# Patient Record
Sex: FEMALE | Race: WHITE | URBAN - METROPOLITAN AREA
[De-identification: names, ages, dates, MRNs, and addresses within clinical notes are randomized per-mention and may not be internally consistent; named-entity substitution may affect disease eponyms.]

---

## 2019-05-10 ENCOUNTER — APPOINTMENT (OUTPATIENT)
Dept: URBAN - METROPOLITAN AREA SURGERY 9 | Age: 49
Setting detail: DERMATOLOGY
End: 2019-05-10

## 2019-05-10 DIAGNOSIS — I78.8 OTHER DISEASES OF CAPILLARIES: ICD-10-CM

## 2019-05-10 DIAGNOSIS — Z85.820 PERSONAL HISTORY OF MALIGNANT MELANOMA OF SKIN: ICD-10-CM

## 2019-05-10 DIAGNOSIS — L663 OTHER SPECIFIED DISEASES OF HAIR AND HAIR FOLLICLES: ICD-10-CM

## 2019-05-10 DIAGNOSIS — D22 MELANOCYTIC NEVI: ICD-10-CM

## 2019-05-10 DIAGNOSIS — Z41.9 ENCOUNTER FOR PROCEDURE FOR PURPOSES OTHER THAN REMEDYING HEALTH STATE, UNSPECIFIED: ICD-10-CM

## 2019-05-10 DIAGNOSIS — L738 OTHER SPECIFIED DISEASES OF HAIR AND HAIR FOLLICLES: ICD-10-CM

## 2019-05-10 DIAGNOSIS — L03.01 CELLULITIS OF FINGER: ICD-10-CM

## 2019-05-10 PROBLEM — F41.9 ANXIETY DISORDER, UNSPECIFIED: Status: ACTIVE | Noted: 2019-05-10

## 2019-05-10 PROBLEM — L02.821 FURUNCLE OF HEAD [ANY PART, EXCEPT FACE]: Status: ACTIVE | Noted: 2019-05-10

## 2019-05-10 PROBLEM — D48.5 NEOPLASM OF UNCERTAIN BEHAVIOR OF SKIN: Status: ACTIVE | Noted: 2019-05-10

## 2019-05-10 PROBLEM — J30.1 ALLERGIC RHINITIS DUE TO POLLEN: Status: ACTIVE | Noted: 2019-05-10

## 2019-05-10 PROBLEM — J45.909 UNSPECIFIED ASTHMA, UNCOMPLICATED: Status: ACTIVE | Noted: 2019-05-10

## 2019-05-10 PROBLEM — D22.39 MELANOCYTIC NEVI OF OTHER PARTS OF FACE: Status: ACTIVE | Noted: 2019-05-10

## 2019-05-10 PROBLEM — L03.011 CELLULITIS OF RIGHT FINGER: Status: ACTIVE | Noted: 2019-05-10

## 2019-05-10 PROBLEM — F32.9 MAJOR DEPRESSIVE DISORDER, SINGLE EPISODE, UNSPECIFIED: Status: ACTIVE | Noted: 2019-05-10

## 2019-05-10 PROBLEM — L03.012 CELLULITIS OF LEFT FINGER: Status: ACTIVE | Noted: 2019-05-10

## 2019-05-10 PROCEDURE — 88305 TISSUE EXAM BY PATHOLOGIST: CPT | Mod: TC

## 2019-05-10 PROCEDURE — OTHER TREATMENT REGIMEN: OTHER

## 2019-05-10 PROCEDURE — OTHER BIOPSY BY SHAVE METHOD: OTHER

## 2019-05-10 PROCEDURE — OTHER COUNSELING: OTHER

## 2019-05-10 PROCEDURE — 11102 TANGNTL BX SKIN SINGLE LES: CPT

## 2019-05-10 PROCEDURE — 99203 OFFICE O/P NEW LOW 30 MIN: CPT | Mod: 25

## 2019-05-10 PROCEDURE — OTHER MIPS QUALITY: OTHER

## 2019-05-10 PROCEDURE — OTHER REASSURANCE: OTHER

## 2019-05-10 PROCEDURE — OTHER RECOMMENDATIONS: OTHER

## 2019-05-10 PROCEDURE — OTHER OTHER: OTHER

## 2019-05-10 PROCEDURE — OTHER PATHOLOGY BILLING: OTHER

## 2019-05-10 PROCEDURE — OTHER PRESCRIPTION: OTHER

## 2019-05-10 RX ORDER — CLINDAMYCIN PHOSPHATE 10 MG/ML
SOLUTION TOPICAL
Qty: 1 | Refills: 11 | Status: ERX | COMMUNITY
Start: 2019-05-10

## 2019-05-10 RX ORDER — ALCLOMETASONE DIPROPIONATE 0.5 MG/G
CREAM TOPICAL
Qty: 1 | Refills: 2 | Status: ERX | COMMUNITY
Start: 2019-05-10

## 2019-05-10 ASSESSMENT — LOCATION DETAILED DESCRIPTION DERM
LOCATION DETAILED: LEFT INFERIOR CENTRAL MALAR CHEEK
LOCATION DETAILED: LEFT LATERAL ZYGOMA
LOCATION DETAILED: RIGHT CHIN
LOCATION DETAILED: PERIUNGUAL SKIN LEFT THUMB
LOCATION DETAILED: RIGHT INFERIOR CENTRAL MALAR CHEEK
LOCATION DETAILED: POSTERIOR MID-PARIETAL SCALP
LOCATION DETAILED: RIGHT SUPERIOR LATERAL MIDBACK
LOCATION DETAILED: LEFT ANKLE
LOCATION DETAILED: PERIUNGUAL SKIN RIGHT THUMB

## 2019-05-10 ASSESSMENT — LOCATION SIMPLE DESCRIPTION DERM
LOCATION SIMPLE: LEFT THUMB
LOCATION SIMPLE: RIGHT THUMB
LOCATION SIMPLE: CHIN
LOCATION SIMPLE: RIGHT LOWER BACK
LOCATION SIMPLE: POSTERIOR SCALP
LOCATION SIMPLE: LEFT CHEEK
LOCATION SIMPLE: LEFT ANKLE
LOCATION SIMPLE: LEFT ZYGOMA
LOCATION SIMPLE: RIGHT CHEEK

## 2019-05-10 ASSESSMENT — LOCATION ZONE DERM
LOCATION ZONE: FINGER
LOCATION ZONE: FACE
LOCATION ZONE: SCALP
LOCATION ZONE: TRUNK
LOCATION ZONE: LEG

## 2019-05-10 NOTE — PROCEDURE: REASSURANCE
Include Location In Plan?: No
Detail Level: Detailed
Additional Note: Confirmed by patient in mirror that she has had this for many years without change.
Quality 137: Melanoma: Continuity Of Care - Recall System: Patient information entered into a recall system that includes: target date for the next exam specified AND a process to follow up with patients regarding missed or unscheduled appointments
Detail Level: Simple

## 2019-05-10 NOTE — PROCEDURE: MIPS QUALITY
Quality 265: Biopsy Follow-Up: Biopsy Results not Reviewed, not Communicated to the Patient and the Patient's Primary Care/Referring Physician, Communication not Tracked in a Log, and/or Tracking Process not Documented in the Patient's Medical Record.
Additional Notes: Patient does not have a PCP.
Detail Level: Detailed

## 2019-05-10 NOTE — HPI: SECONDARY COMPLAINT
Additional History: She states she does tend to rub/pick at these areas with her index fingers.
Additional History: She is interested in R.
Additional History: She is interested in laser removal

## 2019-05-10 NOTE — PROCEDURE: RECOMMENDATIONS
Recommendation Preamble: The following recommendations were made during the visit: consultation with MAGI Story MD for laser.
Detail Level: Zone
Recommendation Preamble: The following recommendations were made during the visit:  consultation with MAGI Story MD for laser.

## 2019-05-10 NOTE — PROCEDURE: TREATMENT REGIMEN
Initiate Treatment: Alclometasone Cream
Plan: Call for Ketoconazole Cream if not improving
Detail Level: Zone

## 2019-05-10 NOTE — PROCEDURE: COUNSELING
Patient Specific Counseling (Will Not Stick From Patient To Patient): By history
Detail Level: Zone
Detail Level: Simple

## 2019-05-10 NOTE — PROCEDURE: PATHOLOGY BILLING
Immunohistochemistry (19627 and 77050) billing is not performed here. Please use the Immunohistochemistry Stain Billing plan to accomplish this. Immunohistochemistry ([id number] and 54804) billing is not performed here. Please use the Immunohistochemistry Stain Billing plan to accomplish this.

## 2019-05-10 NOTE — PROCEDURE: OTHER
Other (Free Text): Discussed RF pathology fee.
Detail Level: Simple
Note Text (......Xxx Chief Complaint.): This diagnosis correlates with the

## 2019-06-10 ENCOUNTER — APPOINTMENT (OUTPATIENT)
Dept: URBAN - METROPOLITAN AREA SURGERY 9 | Age: 49
Setting detail: DERMATOLOGY
End: 2019-06-10

## 2019-06-10 DIAGNOSIS — Z41.9 ENCOUNTER FOR PROCEDURE FOR PURPOSES OTHER THAN REMEDYING HEALTH STATE, UNSPECIFIED: ICD-10-CM

## 2019-06-10 DIAGNOSIS — I78.8 OTHER DISEASES OF CAPILLARIES: ICD-10-CM

## 2019-06-10 PROCEDURE — OTHER COSMETIC CONSULTATION - RED SPOTS: OTHER

## 2019-06-10 PROCEDURE — OTHER COSMETIC CONSULTATION: RED SPOTS: OTHER

## 2019-06-10 PROCEDURE — OTHER COSMETIC CONSULTATION: LHR: OTHER

## 2019-06-10 PROCEDURE — OTHER OTHER: OTHER

## 2019-06-10 ASSESSMENT — LOCATION DETAILED DESCRIPTION DERM
LOCATION DETAILED: NASAL DORSUM
LOCATION DETAILED: LEFT LOWER CUTANEOUS LIP

## 2019-06-10 ASSESSMENT — LOCATION SIMPLE DESCRIPTION DERM
LOCATION SIMPLE: NOSE
LOCATION SIMPLE: LEFT LIP

## 2019-06-10 ASSESSMENT — LOCATION ZONE DERM
LOCATION ZONE: LIP
LOCATION ZONE: NOSE

## 2019-06-10 NOTE — PROCEDURE: OTHER
Other (Free Text): $600/treatment.  Reviewed with patient that we would expect 3 treatments for optimal results
Detail Level: Detailed
Note Text (......Xxx Chief Complaint.): This diagnosis correlates with the

## 2019-09-23 ENCOUNTER — APPOINTMENT (OUTPATIENT)
Dept: URBAN - METROPOLITAN AREA SURGERY 9 | Age: 49
Setting detail: DERMATOLOGY
End: 2019-09-23

## 2019-09-23 DIAGNOSIS — Z41.9 ENCOUNTER FOR PROCEDURE FOR PURPOSES OTHER THAN REMEDYING HEALTH STATE, UNSPECIFIED: ICD-10-CM

## 2019-09-23 PROCEDURE — OTHER LASER HAIR REMOVAL: OTHER

## 2019-09-23 ASSESSMENT — LOCATION DETAILED DESCRIPTION DERM
LOCATION DETAILED: PHILTRUM
LOCATION DETAILED: RIGHT CHIN

## 2019-09-23 ASSESSMENT — LOCATION SIMPLE DESCRIPTION DERM
LOCATION SIMPLE: CHIN
LOCATION SIMPLE: UPPER LIP

## 2019-09-23 ASSESSMENT — LOCATION ZONE DERM
LOCATION ZONE: FACE
LOCATION ZONE: LIP

## 2019-09-23 NOTE — PROCEDURE: LASER HAIR REMOVAL
Price (Use Numbers Only, No Special Characters Or $): 649 Price (Use Numbers Only, No Special Characters Or $): 261

## 2020-01-27 ENCOUNTER — APPOINTMENT (OUTPATIENT)
Dept: URBAN - METROPOLITAN AREA SURGERY 9 | Age: 50
Setting detail: DERMATOLOGY
End: 2020-01-27

## 2020-01-27 DIAGNOSIS — Z41.9 ENCOUNTER FOR PROCEDURE FOR PURPOSES OTHER THAN REMEDYING HEALTH STATE, UNSPECIFIED: ICD-10-CM

## 2020-01-27 PROCEDURE — OTHER OTHER (COSMETIC): OTHER

## 2020-01-27 PROCEDURE — OTHER LASER HAIR REMOVAL: OTHER

## 2020-01-27 NOTE — HPI: COSMETIC (LASER HAIR REMOVAL)
Have You Had Laser Hair Removal Before?: has had previous treatment
Additional History: Tx #2.
When Was Your Last Laser Treatment?: 09/23/2019
Number Of Treatments: 1

## 2020-01-27 NOTE — PROCEDURE: LASER HAIR REMOVAL
Post-Procedure Care: Immediate endpoint: perifollicular erythema and edema. Aloe and sunscreen applied. Post care reviewed with patient.

## 2020-01-27 NOTE — PROCEDURE: OTHER (COSMETIC)
Detail Level: Zone
Other (Free Text): Patient tolerated increased settings well; Mild to moderate perifollicular erythema and edema. Wiped off moisturizer/makeup prior to tx. Reviewed post laser care and provided with handout. \\n\\nTx #1. Cosmetic fee of $250.

## 2020-01-27 NOTE — PROCEDURE: LASER HAIR REMOVAL
Price (Use Numbers Only, No Special Characters Or $): 404 Price (Use Numbers Only, No Special Characters Or $): 988

## 2022-06-24 ENCOUNTER — OFFICE VISIT (OUTPATIENT)
Dept: OBGYN CLINIC | Age: 52
End: 2022-06-24
Payer: COMMERCIAL

## 2022-06-24 VITALS
SYSTOLIC BLOOD PRESSURE: 130 MMHG | WEIGHT: 231 LBS | DIASTOLIC BLOOD PRESSURE: 72 MMHG | BODY MASS INDEX: 36.26 KG/M2 | HEIGHT: 67 IN

## 2022-06-24 DIAGNOSIS — Z11.51 SCREENING FOR HUMAN PAPILLOMAVIRUS: ICD-10-CM

## 2022-06-24 DIAGNOSIS — Z12.31 SCREENING MAMMOGRAM, ENCOUNTER FOR: ICD-10-CM

## 2022-06-24 DIAGNOSIS — Z01.419 PAP SMEAR, AS PART OF ROUTINE GYNECOLOGICAL EXAMINATION: Primary | ICD-10-CM

## 2022-06-24 PROCEDURE — 99386 PREV VISIT NEW AGE 40-64: CPT | Performed by: OBSTETRICS & GYNECOLOGY

## 2022-06-24 RX ORDER — CETIRIZINE HYDROCHLORIDE 10 MG/1
TABLET ORAL
COMMUNITY
Start: 2022-05-19

## 2022-06-24 RX ORDER — DESVENLAFAXINE 100 MG/1
TABLET, EXTENDED RELEASE ORAL
COMMUNITY
Start: 2022-04-26

## 2022-06-24 RX ORDER — MONTELUKAST SODIUM 10 MG/1
TABLET ORAL
COMMUNITY
Start: 2022-05-19

## 2022-06-24 RX ORDER — LORAZEPAM 1 MG/1
TABLET ORAL
COMMUNITY
Start: 2022-05-19

## 2022-06-24 ASSESSMENT — ENCOUNTER SYMPTOMS
ANAL BLEEDING: 0
NAUSEA: 0
DIARRHEA: 0
ALLERGIC/IMMUNOLOGIC NEGATIVE: 1
ABDOMINAL DISTENTION: 0
RESPIRATORY NEGATIVE: 1
BLOOD IN STOOL: 0
CONSTIPATION: 0
VOMITING: 0
ABDOMINAL PAIN: 0
RECTAL PAIN: 0
EYES NEGATIVE: 1

## 2022-06-24 ASSESSMENT — PATIENT HEALTH QUESTIONNAIRE - PHQ9
2. FEELING DOWN, DEPRESSED OR HOPELESS: 0
SUM OF ALL RESPONSES TO PHQ QUESTIONS 1-9: 0
SUM OF ALL RESPONSES TO PHQ QUESTIONS 1-9: 0
1. LITTLE INTEREST OR PLEASURE IN DOING THINGS: 0
SUM OF ALL RESPONSES TO PHQ9 QUESTIONS 1 & 2: 0
SUM OF ALL RESPONSES TO PHQ QUESTIONS 1-9: 0
SUM OF ALL RESPONSES TO PHQ QUESTIONS 1-9: 0

## 2022-06-24 ASSESSMENT — VISUAL ACUITY: OU: 1

## 2022-06-24 NOTE — PROGRESS NOTES
The patient was asked if she would like a chaperone present for her intimate exam. She  declines the chaperone.  Zak Johns MA

## 2022-06-24 NOTE — PROGRESS NOTES
Subjective:      Patient ID: Harleen Belcher is a 50 y.o. female    Annual exam.  New patient; reviewed medical, surgical, social and family history. Also reviewed current medications and allergies. No PMB. No GYN complaints. Pap performed and screening mammogram ordered. Monthly SBE encouraged. Screening colonoscopy recommended per routine. F/U annual exam or prn. Vitals:  /72   Ht 5' 7\" (1.702 m)   Wt 231 lb (104.8 kg)   LMP 06/03/2022   BMI 36.18 kg/m²   Past Medical History:   Diagnosis Date    Abnormal Pap smear of cervix     Cancer ([facility] [address]     melanoma    Depression     Encounter for assisted reproductive fertility cycle     Herpes simplex virus (HSV) infection      Past Surgical History:   Procedure Laterality Date    LYMPH NODE BIOPSY      SKIN SURGERY      cancer melanoma    TOE SURGERY       Allergies:  Bee pollen  Current Outpatient Medications   Medication Sig Dispense Refill    cetirizine (ZYRTEC) 10 MG tablet TAKE 1 TABLET BY MOUTH TWICE DAILY      sertraline (ZOLOFT) 50 MG tablet take ONE-HALF TABLET BY MOUTH for 6 (SIX) days, then TAKE 1 TABLET EVERY DAY therafter      montelukast (SINGULAIR) 10 MG tablet TAKE 1 TABLET BY MOUTH ONCE DAILY      desvenlafaxine succinate (PRISTIQ) 100 MG TB24 extended release tablet TAKE 1 TABLET BY MOUTH EVERY DAY FOR 90 DAYS      LORazepam (ATIVAN) 1 MG tablet TAKE 1 TABLET ONCE DAILY AS NEEDED       No current facility-administered medications for this visit.      Social History     Socioeconomic History    Marital status: Single     Spouse name: Not on file    Number of children: Not on file    Years of education: Not on file    Highest education level: Not on file   Occupational History    Not on file   Tobacco Use    Smoking status: Former Smoker    Smokeless tobacco: Never Used   Substance and Sexual Activity    Alcohol use: Not on file    Drug use: Never    Sexual activity: Not Currently   Other Topics Concern    Not on file Social History Narrative    Not on file     Social Determinants of Health     Financial Resource Strain:     Difficulty of Paying Living Expenses: Not on file   Food Insecurity:     Worried About Running Out of Food in the Last Year: Not on file    Supa of Food in the Last Year: Not on file   Transportation Needs:     Lack of Transportation (Medical): Not on file    Lack of Transportation (Non-Medical): Not on file   Physical Activity:     Days of Exercise per Week: Not on file    Minutes of Exercise per Session: Not on file   Stress:     Feeling of Stress : Not on file   Social Connections:     Frequency of Communication with Friends and Family: Not on file    Frequency of Social Gatherings with Friends and Family: Not on file    Attends [employer] Services: Not on file    Active Member of Clubs or Organizations: Not on file    Attends Club or Organization Meetings: Not on file    Marital Status: Not on file   Intimate Partner Violence:     Fear of Current or Ex-Partner: Not on file    Emotionally Abused: Not on file    Physically Abused: Not on file    Sexually Abused: Not on file   Housing Stability:     Unable to Pay for Housing in the Last Year: Not on file    Number of Places Lived in the Last Year: Not on file    Unstable Housing in the Last Year: Not on file     Family History   Problem Relation Age of Onset    Asthma Neg Hx     Breast Cancer Neg Hx     Cancer Neg Hx     Bleeding Prob Neg Hx     Colon Cancer Neg Hx     Deep Vein Thrombosis Neg Hx     Eclampsia Neg Hx     Hemophilia Neg Hx     Intellectual Disability Neg Hx     Mental Illness Neg Hx     Preeclampsia Neg Hx      Labor Neg Hx     Spont Abortions Neg Hx     Stroke Neg Hx     Uterine Cancer Neg Hx     Ovarian Cancer Neg Hx     Migraines Neg Hx     Heart Surgery Neg Hx     Diabetes Neg Hx     Congenital Anomalies Neg Hx        Review of Systems   Constitutional: Negative.   Negative for activity Genitourinary:     General: Normal vulva. Pubic Area: No rash. Labia:         Right: No rash, tenderness, lesion or injury. Left: No rash, tenderness, lesion or injury. Urethra: No prolapse, urethral swelling or urethral lesion. Vagina: Normal. No signs of injury and foreign body. No vaginal discharge, erythema, tenderness or bleeding. Cervix: No cervical motion tenderness, discharge or friability. Uterus: Not deviated, not enlarged, not fixed and not tender. Adnexa:         Right: No mass, tenderness or fullness. Left: No mass, tenderness or fullness. Rectum: Normal.   Musculoskeletal:         General: No tenderness. Normal range of motion. Cervical back: Normal range of motion and neck supple. Lymphadenopathy:      Cervical: No cervical adenopathy. Upper Body:      Right upper body: No supraclavicular or axillary adenopathy. Left upper body: No supraclavicular or axillary adenopathy. Lower Body: No right inguinal adenopathy. No left inguinal adenopathy. Skin:     General: Skin is warm and dry. Coloration: Skin is not pale. Findings: No erythema or rash. Neurological:      Mental Status: She is alert and oriented to person, place, and time. Psychiatric:         Behavior: Behavior normal.         Thought Content: Thought content normal.         Judgment: Judgment normal.         Assessment:       Diagnosis Orders   1. Pap smear, as part of routine gynecological examination  PAP SMEAR   2. Screening mammogram, encounter for  CARLOS ALBERTO DIGITAL SCREEN W OR WO CAD BILATERAL   3. Screening for human papillomavirus  PAP SMEAR        Plan:      Medications placedthis encounter:  No orders of the defined types were placed in this encounter.         Orders placedthis encounter:  Orders Placed This Encounter   Procedures    CARLOS ALBERTO DIGITAL SCREEN W OR WO CAD BILATERAL     Standing Status:   Future     Standing Expiration Date: 6/24/2023    PAP SMEAR     Patient History:    Patient's last menstrual period was 06/03/2022. OBGYN Status: unknown  Past Surgical History:  No date: LYMPH NODE BIOPSY  No date: SKIN SURGERY      Comment:  cancer melanoma  No date: TOE SURGERY      Social History    Tobacco Use      Smoking status: Former Smoker      Smokeless tobacco: Never Used       Standing Status:   Future     Standing Expiration Date:   6/24/2023     Order Specific Question:   Collection Type     Answer: Thin Prep     Order Specific Question:   Prior Abnormal Pap Test     Answer:   No     Order Specific Question:   Screening or Diagnostic     Answer:   Screening     Order Specific Question:   HPV Requested? Answer:   Yes     Comments:   16/18     Order Specific Question:   High Risk Patient     Answer:   N/A         Follow up:  Return in about 1 year (around 6/24/2023) for Annual.   Repeat Annual GYN exam every 1 year. Cervical Cytology exam starts age 24. If Negative Cytology;  follow-up screening per current guidelines. Mammograms yearly starting at age 36. Calcium and Vitamin D dosing reviewed ( age appropriate ). Colonoscopy and bone density screening discussed ( age appropriate ). Birth control and STD prevention discussed ( age appropriate ). Gardisil counseling completed for all patients ( age appropriate ). Routine health maintenance ( per PCP and guidelines ).

## 2022-07-01 DIAGNOSIS — Z01.419 PAP SMEAR, AS PART OF ROUTINE GYNECOLOGICAL EXAMINATION: ICD-10-CM

## 2022-07-01 DIAGNOSIS — Z11.51 SCREENING FOR HUMAN PAPILLOMAVIRUS: ICD-10-CM

## 2022-07-08 ENCOUNTER — HOSPITAL ENCOUNTER (OUTPATIENT)
Dept: WOMENS IMAGING | Age: 52
Discharge: HOME | End: 2022-07-10
Payer: COMMERCIAL

## 2022-07-08 DIAGNOSIS — Z12.31 SCREENING MAMMOGRAM, ENCOUNTER FOR: ICD-10-CM

## 2022-07-08 PROCEDURE — 77063 BREAST TOMOSYNTHESIS BI: CPT

## 2022-12-20 ENCOUNTER — OFFICE VISIT (OUTPATIENT)
Dept: PRIMARY CARE CLINIC | Age: 52
End: 2022-12-20
Payer: COMMERCIAL

## 2022-12-20 VITALS
WEIGHT: 220 LBS | TEMPERATURE: 98.2 F | DIASTOLIC BLOOD PRESSURE: 74 MMHG | SYSTOLIC BLOOD PRESSURE: 132 MMHG | BODY MASS INDEX: 34.46 KG/M2 | OXYGEN SATURATION: 99 % | HEART RATE: 88 BPM

## 2022-12-20 DIAGNOSIS — J01.41 ACUTE RECURRENT PANSINUSITIS: Primary | ICD-10-CM

## 2022-12-20 PROCEDURE — 1036F TOBACCO NON-USER: CPT | Performed by: NURSE PRACTITIONER

## 2022-12-20 PROCEDURE — G8482 FLU IMMUNIZE ORDER/ADMIN: HCPCS | Performed by: NURSE PRACTITIONER

## 2022-12-20 PROCEDURE — G8427 DOCREV CUR MEDS BY ELIG CLIN: HCPCS | Performed by: NURSE PRACTITIONER

## 2022-12-20 PROCEDURE — G8417 CALC BMI ABV UP PARAM F/U: HCPCS | Performed by: NURSE PRACTITIONER

## 2022-12-20 PROCEDURE — 3017F COLORECTAL CA SCREEN DOC REV: CPT | Performed by: NURSE PRACTITIONER

## 2022-12-20 PROCEDURE — 99213 OFFICE O/P EST LOW 20 MIN: CPT | Performed by: NURSE PRACTITIONER

## 2022-12-20 RX ORDER — IVERMECTIN 10 MG/G
CREAM TOPICAL
COMMUNITY

## 2022-12-20 RX ORDER — AMOXICILLIN AND CLAVULANATE POTASSIUM 875; 125 MG/1; MG/1
1 TABLET, FILM COATED ORAL 2 TIMES DAILY
Qty: 20 TABLET | Refills: 0 | Status: SHIPPED | OUTPATIENT
Start: 2022-12-20 | End: 2022-12-30

## 2022-12-20 RX ORDER — ALCLOMETASONE DIPROPIONATE 0.5 MG/G
CREAM TOPICAL
COMMUNITY

## 2022-12-20 RX ORDER — VALACYCLOVIR HYDROCHLORIDE 500 MG/1
TABLET, FILM COATED ORAL
COMMUNITY

## 2022-12-20 RX ORDER — CLINDAMYCIN PHOSPHATE 11.9 MG/ML
SOLUTION TOPICAL
COMMUNITY

## 2022-12-20 NOTE — PROGRESS NOTES
Subjective:      Patient ID: Kim To is a 50 y.o. female who presents today for:  Chief Complaint   Patient presents with    Sinus Problem     X 4 days        HPI  Patient is here with fatigue, PND, and sinus pain and pressure. Says she is treating with OCT medications. Says symptoms started about 1 week ago and have worsened over the last few days. Says she has had over 50-80 sinus infections in her lifetime and knows that is what this is. Nasal drainage is thick and yellow. Says she is taking OTC medications for symptoms and not helping. Reports she has had covid twice this year. She does not want testing for flu or covid.    Past Medical History:   Diagnosis Date    Abnormal Pap smear of cervix     Cancer ([facility] [address]     melanoma    Depression     Encounter for assisted reproductive fertility cycle     Herpes simplex virus (HSV) infection      Past Surgical History:   Procedure Laterality Date    LYMPH NODE BIOPSY      SKIN SURGERY      cancer melanoma    TOE SURGERY       Social History     Socioeconomic History    Marital status: Single     Spouse name: Not on file    Number of children: Not on file    Years of education: Not on file    Highest education level: Not on file   Occupational History    Not on file   Tobacco Use    Smoking status: Former    Smokeless tobacco: Never   Substance and Sexual Activity    Alcohol use: Not on file    Drug use: Never    Sexual activity: Not Currently   Other Topics Concern    Not on file   Social History Narrative    Not on file     Social Determinants of Health     Financial Resource Strain: Not on file   Food Insecurity: Not on file   Transportation Needs: Not on file   Physical Activity: Not on file   Stress: Not on file   Social Connections: Not on file   Intimate Partner Violence: Not on file   Housing Stability: Not on file     Family History   Problem Relation Age of Onset    Breast Cancer Maternal Aunt         in her late 52's or early 63's    Asthma Neg Hx Cancer Neg Hx     Bleeding Prob Neg Hx     Colon Cancer Neg Hx     Deep Vein Thrombosis Neg Hx     Eclampsia Neg Hx     Hemophilia Neg Hx     Intellectual Disability Neg Hx     Mental Illness Neg Hx     Preeclampsia Neg Hx      Labor Neg Hx     Spont Abortions Neg Hx     Stroke Neg Hx     Uterine Cancer Neg Hx     Ovarian Cancer Neg Hx     Migraines Neg Hx     Heart Surgery Neg Hx     Diabetes Neg Hx     Congenital Anomalies Neg Hx      Allergies   Allergen Reactions    Sulfa Antibiotics Swelling    Bee Pollen      congestion    Molds & Smuts     Pollen Extract     Short Ragweed Pollen Ext      Current Outpatient Medications   Medication Sig Dispense Refill    amoxicillin-clavulanate (AUGMENTIN) 875-125 MG per tablet Take 1 tablet by mouth 2 times daily for 10 days 20 tablet 0    cetirizine (ZYRTEC) 10 MG tablet TAKE 1 TABLET BY MOUTH TWICE DAILY      sertraline (ZOLOFT) 50 MG tablet take ONE-HALF TABLET BY MOUTH for 6 (SIX) days, then TAKE 1 TABLET EVERY DAY therafter      montelukast (SINGULAIR) 10 MG tablet TAKE 1 TABLET BY MOUTH ONCE DAILY      desvenlafaxine succinate (PRISTIQ) 100 MG TB24 extended release tablet TAKE 1 TABLET BY MOUTH EVERY DAY FOR 90 DAYS      LORazepam (ATIVAN) 1 MG tablet TAKE 1 TABLET ONCE DAILY AS NEEDED      valACYclovir (VALTREX) 500 MG tablet Valtrex 500 mg tablet   Take 1 tablet as needed by oral route. alclomethasone (ACLOVATE) 0.05 % cream alclometasone 0.05 % topical cream      clindamycin (CLEOCIN T) 1 % external solution clindamycin phosphate 1 % topical solution      Ivermectin 1 % CREA ivermectin 1 % topical cream   Apply thin film daily as directed       No current facility-administered medications for this visit. Review of Systems   Constitutional:  Positive for fatigue. Negative for activity change, appetite change, chills, diaphoresis, fever and unexpected weight change.    HENT:  Positive for congestion, ear pain, postnasal drip, rhinorrhea, sinus pressure, sinus pain, sneezing and sore throat. Negative for ear discharge, facial swelling, tinnitus, trouble swallowing and voice change. Respiratory:  Negative for cough, chest tightness, shortness of breath, wheezing and stridor. Cardiovascular:  Negative for chest pain. Gastrointestinal:  Negative for abdominal pain, diarrhea, nausea and vomiting. Musculoskeletal:  Negative for arthralgias and myalgias. Skin:  Negative for rash. Neurological:  Negative for dizziness, weakness, light-headedness and headaches. Hematological:  Negative for adenopathy. Objective:   /74 (Site: Right Upper Arm)   Pulse 88   Temp 98.2 °F (36.8 °C) (Temporal)   Wt 220 lb (99.8 kg)   SpO2 99%   BMI 34.46 kg/m²     Physical Exam  Vitals reviewed. Constitutional:       General: She is awake. She is not in acute distress. Appearance: Normal appearance. She is well-developed, well-groomed and normal weight. She is not ill-appearing, toxic-appearing or diaphoretic. HENT:      Head: Normocephalic and atraumatic. Right Ear: Hearing, ear canal and external ear normal. There is no impacted cerumen. Left Ear: Hearing, ear canal and external ear normal. There is no impacted cerumen. Nose: Congestion present. Right Turbinates: Swollen. Left Turbinates: Swollen. Right Sinus: No maxillary sinus tenderness or frontal sinus tenderness. Left Sinus: No maxillary sinus tenderness or frontal sinus tenderness. Mouth/Throat:      Lips: Pink. Mouth: Mucous membranes are moist. No oral lesions. Dentition: No gum lesions. Tongue: No lesions. Palate: No lesions. Pharynx: Oropharynx is clear. Uvula midline. No pharyngeal swelling, oropharyngeal exudate, posterior oropharyngeal erythema or uvula swelling. Tonsils: No tonsillar exudate or tonsillar abscesses. 0 on the right. 0 on the left.    Eyes:      General: Lids are normal.      Extraocular Movements: Extraocular movements intact. Conjunctiva/sclera: Conjunctivae normal.   Neck:      Trachea: Trachea normal.   Cardiovascular:      Rate and Rhythm: Normal rate and regular rhythm. Pulses: Normal pulses. Heart sounds: Normal heart sounds, S1 normal and S2 normal.   Pulmonary:      Effort: Pulmonary effort is normal.      Breath sounds: Normal breath sounds and air entry. Musculoskeletal:         General: Normal range of motion. Cervical back: Normal range of motion and neck supple. No tenderness. Lymphadenopathy:      Cervical: No cervical adenopathy. Skin:     General: Skin is warm and dry. Capillary Refill: Capillary refill takes less than 2 seconds. Neurological:      General: No focal deficit present. Mental Status: She is alert and oriented to person, place, and time. Mental status is at baseline. Psychiatric:         Attention and Perception: Attention and perception normal.         Mood and Affect: Mood and affect normal.         Speech: Speech normal.         Behavior: Behavior normal. Behavior is cooperative. Thought Content: Thought content normal.         Cognition and Memory: Cognition and memory normal.         Judgment: Judgment normal.       Assessment:       Diagnosis Orders   1. Acute recurrent pansinusitis  amoxicillin-clavulanate (AUGMENTIN) 875-125 MG per tablet        No results found for this visit on 12/20/22. Plan:     Assessment & Plan   Ary Tim was seen today for sinus problem. Diagnoses and all orders for this visit:    Acute recurrent pansinusitis  -     amoxicillin-clavulanate (AUGMENTIN) 875-125 MG per tablet; Take 1 tablet by mouth 2 times daily for 10 days    Will treat for bacterial sinus infection. Advised to take ANTB with food and increase fluids. Discussed administration and SE of the ANTB as well as using probiotic or eating yogurt while on the ANTB.   Advised may use OTC decongestant and nasal steroid to help with symptoms as needed. Discussed with patient should start to see improvement within 3-4 days and should f/u with worsening sx or with no improvement. Antibiotic Instructions:   Complete the full course of antibiotics as ordered. To prevent antibiotic resistances please take medication as ordered and for the full duration even if you start to feel better. Take each dose with a small snack or meal to lessen potential GI upset. Consider intake of yogurt or probiotic during antibiotic use and for a few days after to help reduce the risk of developing a secondary infection. Take the yogurt or probiotic at least 2 hours after taking the antibiotic. Avoid alcohol while taking antibiotics. If you are using contraception please use a back up method of contraception such as condoms during antibiotic use and for 7 days after completing treatment to prevent pregnancy. No orders of the defined types were placed in this encounter. Orders Placed This Encounter   Medications    amoxicillin-clavulanate (AUGMENTIN) 875-125 MG per tablet     Sig: Take 1 tablet by mouth 2 times daily for 10 days     Dispense:  20 tablet     Refill:  0     There are no discontinued medications. Return for if symptoms do not improve in 3-5 days, worsening of condition. Reviewed with the patient/family: current clinical status & medications. Side effects of the medication prescribed today, as well as treatment plan/rationale and result expectations have been discussed with the patient/family who expresses understanding. Patient will be discharged home in stable condition. Follow up with PCP to evaluate treatment results or return if symptoms worsen or fail to improve. Discussed signs and symptoms which require immediate follow-up in ED/call to 911. Understanding verbalized. I have reviewed the patient's medical history in detail and updated the computerized patient record.     Keren Lord, JEFF - CNP

## 2022-12-22 ASSESSMENT — ENCOUNTER SYMPTOMS
SINUS PAIN: 1
WHEEZING: 0
NAUSEA: 0
VOMITING: 0
ABDOMINAL PAIN: 0
TROUBLE SWALLOWING: 0
SINUS PRESSURE: 1
CHEST TIGHTNESS: 0
COUGH: 0
SHORTNESS OF BREATH: 0
FACIAL SWELLING: 0
RHINORRHEA: 1
SORE THROAT: 1
STRIDOR: 0
DIARRHEA: 0
VOICE CHANGE: 0

## 2023-12-29 ENCOUNTER — HOSPITAL ENCOUNTER (OUTPATIENT)
Dept: WOMENS IMAGING | Age: 53
Discharge: HOME | End: 2023-12-29
Payer: COMMERCIAL

## 2023-12-29 VITALS — BODY MASS INDEX: 34.46 KG/M2 | HEIGHT: 67 IN

## 2023-12-29 DIAGNOSIS — Z12.31 VISIT FOR SCREENING MAMMOGRAM: ICD-10-CM

## 2023-12-29 PROCEDURE — 77063 BREAST TOMOSYNTHESIS BI: CPT

## 2024-01-15 ENCOUNTER — TELEPHONE (OUTPATIENT)
Age: 54
End: 2024-01-15

## 2024-01-23 ENCOUNTER — HOSPITAL ENCOUNTER (OUTPATIENT)
Dept: CT IMAGING | Age: 54
Discharge: HOME | End: 2024-01-25
Payer: COMMERCIAL

## 2024-01-23 DIAGNOSIS — J32.9 CHRONIC SINUSITIS, UNSPECIFIED LOCATION: ICD-10-CM

## 2024-01-23 PROCEDURE — 70486 CT MAXILLOFACIAL W/O DYE: CPT

## 2024-01-25 ENCOUNTER — OFFICE VISIT (OUTPATIENT)
Age: 54
End: 2024-01-25

## 2024-01-25 VITALS
BODY MASS INDEX: 36.73 KG/M2 | HEIGHT: 67 IN | DIASTOLIC BLOOD PRESSURE: 78 MMHG | SYSTOLIC BLOOD PRESSURE: 120 MMHG | WEIGHT: 234 LBS | TEMPERATURE: 98.2 F

## 2024-01-25 DIAGNOSIS — J34.2 NASAL SEPTAL DEVIATION: ICD-10-CM

## 2024-01-25 DIAGNOSIS — J34.89 RHINORRHEA: ICD-10-CM

## 2024-01-25 DIAGNOSIS — J01.90 SUBACUTE SINUSITIS, UNSPECIFIED LOCATION: Primary | ICD-10-CM

## 2024-01-25 DIAGNOSIS — J34.3 HYPERTROPHY OF INFERIOR NASAL TURBINATE: ICD-10-CM

## 2024-01-25 DIAGNOSIS — J32.9 RECURRENT SINUS INFECTIONS: ICD-10-CM

## 2024-01-25 DIAGNOSIS — J30.9 ALLERGIC RHINITIS, UNSPECIFIED SEASONALITY, UNSPECIFIED TRIGGER: ICD-10-CM

## 2024-01-25 DIAGNOSIS — R09.82 POSTNASAL DRIP: ICD-10-CM

## 2024-01-25 DIAGNOSIS — R44.8 FACIAL PRESSURE: ICD-10-CM

## 2024-01-25 DIAGNOSIS — J34.89 NASAL OBSTRUCTION: ICD-10-CM

## 2024-01-25 RX ORDER — AMOXICILLIN AND CLAVULANATE POTASSIUM 875; 125 MG/1; MG/1
1 TABLET, FILM COATED ORAL 2 TIMES DAILY
Qty: 28 TABLET | Refills: 0 | Status: SHIPPED | OUTPATIENT
Start: 2024-01-25 | End: 2024-02-08

## 2024-01-25 RX ORDER — PREDNISONE 20 MG/1
20 TABLET ORAL 2 TIMES DAILY
Qty: 10 TABLET | Refills: 0 | Status: SHIPPED | OUTPATIENT
Start: 2024-01-25 | End: 2024-01-30

## 2024-01-25 RX ORDER — AMOXICILLIN AND CLAVULANATE POTASSIUM 875; 125 MG/1; MG/1
1 TABLET, FILM COATED ORAL 2 TIMES DAILY
Qty: 20 TABLET | Refills: 0 | Status: SHIPPED | OUTPATIENT
Start: 2024-01-25 | End: 2024-01-25

## 2024-01-25 RX ORDER — TRETINOIN 0.5 MG/G
CREAM TOPICAL
COMMUNITY
Start: 2023-11-03

## 2024-01-25 RX ORDER — DROSPIRENONE 4 MG/1
TABLET, FILM COATED ORAL
COMMUNITY

## 2024-01-25 RX ORDER — FLUNISOLIDE 0.25 MG/ML
SOLUTION NASAL
COMMUNITY
Start: 2023-12-20

## 2024-01-25 NOTE — PROGRESS NOTES
Stroke Neg Hx     Uterine Cancer Neg Hx     Ovarian Cancer Neg Hx     Migraines Neg Hx     Heart Surgery Neg Hx     Diabetes Neg Hx     Congenital Anomalies Neg Hx      Social History     Tobacco Use    Smoking status: Former    Smokeless tobacco: Never   Substance Use Topics    Alcohol use: Not on file       PMH, Surgical Hx, Family Hx, and Social Hx reviewed and updated.    Objective     Vitals:    01/25/24 0735   BP: 120/78   Temp: 98.2 °F (36.8 °C)   Weight: 106.1 kg (234 lb)   Height: 1.702 m (5' 7\")        Physical Exam  Constitutional:       Appearance: Normal appearance.   HENT:      Head: Normocephalic and atraumatic.      Right Ear: External ear normal.      Left Ear: External ear normal.      Nose: Septal deviation (right sided septal spur with contact to inferior turbinate, left sided septal deviation) present. No nasal deformity.      Mouth/Throat:      Mouth: Mucous membranes are moist.      Dentition: Normal dentition.      Pharynx: Oropharynx is clear.   Eyes:      Extraocular Movements: Extraocular movements intact.   Pulmonary:      Effort: Pulmonary effort is normal.   Lymphadenopathy:      Cervical: No cervical adenopathy.   Neurological:      General: No focal deficit present.      Mental Status: She is alert and oriented to person, place, and time.      Cranial Nerves: No cranial nerve deficit (slight right V2 numbness).   Psychiatric:         Mood and Affect: Mood normal.         Behavior: Behavior normal.         Imaging:   I personally reviewed and independently interpreted the below studies.  CT sinus 1/23/24  FINDINGS:  SINUSES/MASTOIDS:  Inspissated fluid in the posteroinferior portions of the  bilateral maxillary sinuses with adjacent mucosal thickening left greater  than right of bilateral maxillary sinusitis, additional mucosal thickening  edema in the right ethmoid air cells otherwise the remaining sphenoid,  ethmoid and frontal sinuses are clear.  The bilateral ostiomeatal units

## 2024-01-29 ENCOUNTER — TELEPHONE (OUTPATIENT)
Age: 54
End: 2024-01-29

## 2024-01-29 NOTE — TELEPHONE ENCOUNTER
Dr. Garay's office would like the office notes from patient's LOV (Dr. Dumont) please fax: [phone] (attn: Karon)

## 2025-04-17 ENCOUNTER — HOSPITAL ENCOUNTER (OUTPATIENT)
Dept: GENERAL RADIOLOGY | Age: 55
Discharge: HOME | End: 2025-04-19
Payer: COMMERCIAL

## 2025-04-17 DIAGNOSIS — M25.562 PAIN IN JOINT OF LEFT KNEE: ICD-10-CM

## 2025-04-17 DIAGNOSIS — M25.561 PAIN, JOINT, KNEE, RIGHT: ICD-10-CM

## 2025-04-17 PROCEDURE — 73560 X-RAY EXAM OF KNEE 1 OR 2: CPT

## 2025-05-15 ENCOUNTER — TRANSCRIBE ORDERS (OUTPATIENT)
Dept: ADMINISTRATIVE | Age: 55
End: 2025-05-15

## 2025-05-15 DIAGNOSIS — Z12.31 ENCOUNTER FOR SCREENING MAMMOGRAM FOR MALIGNANT NEOPLASM OF BREAST: Primary | ICD-10-CM

## 2025-05-22 ENCOUNTER — TRANSCRIBE ORDERS (OUTPATIENT)
Dept: ADMINISTRATIVE | Age: 55
End: 2025-05-22

## 2025-05-22 DIAGNOSIS — M25.319 INSTABILITY OF SHOULDER JOINT, UNSPECIFIED LATERALITY: Primary | ICD-10-CM

## 2025-06-02 ENCOUNTER — HOSPITAL ENCOUNTER (OUTPATIENT)
Dept: WOMENS IMAGING | Age: 55
Discharge: HOME | End: 2025-06-04
Payer: COMMERCIAL

## 2025-06-02 VITALS — HEIGHT: 67 IN | BODY MASS INDEX: 36.64 KG/M2

## 2025-06-02 DIAGNOSIS — Z12.31 ENCOUNTER FOR SCREENING MAMMOGRAM FOR MALIGNANT NEOPLASM OF BREAST: ICD-10-CM

## 2025-06-02 PROCEDURE — 77063 BREAST TOMOSYNTHESIS BI: CPT
